# Patient Record
Sex: FEMALE | Race: WHITE | NOT HISPANIC OR LATINO | Employment: OTHER | URBAN - NONMETROPOLITAN AREA
[De-identification: names, ages, dates, MRNs, and addresses within clinical notes are randomized per-mention and may not be internally consistent; named-entity substitution may affect disease eponyms.]

---

## 2017-01-19 ENCOUNTER — OFFICE VISIT (OUTPATIENT)
Dept: GASTROENTEROLOGY | Facility: CLINIC | Age: 47
End: 2017-01-19

## 2017-01-19 VITALS
HEART RATE: 109 BPM | WEIGHT: 121.6 LBS | OXYGEN SATURATION: 98 % | HEIGHT: 67 IN | BODY MASS INDEX: 19.09 KG/M2 | DIASTOLIC BLOOD PRESSURE: 83 MMHG | SYSTOLIC BLOOD PRESSURE: 128 MMHG

## 2017-01-19 DIAGNOSIS — R10.32 LEFT LOWER QUADRANT PAIN: ICD-10-CM

## 2017-01-19 DIAGNOSIS — R11.2 NAUSEA AND VOMITING, INTRACTABILITY OF VOMITING NOT SPECIFIED, UNSPECIFIED VOMITING TYPE: ICD-10-CM

## 2017-01-19 DIAGNOSIS — K29.70 GASTRITIS WITHOUT BLEEDING, UNSPECIFIED CHRONICITY, UNSPECIFIED GASTRITIS TYPE: ICD-10-CM

## 2017-01-19 DIAGNOSIS — R63.4 WEIGHT LOSS: ICD-10-CM

## 2017-01-19 DIAGNOSIS — R10.12 LEFT UPPER QUADRANT PAIN: Primary | ICD-10-CM

## 2017-01-19 PROCEDURE — 99244 OFF/OP CNSLTJ NEW/EST MOD 40: CPT | Performed by: INTERNAL MEDICINE

## 2017-01-19 RX ORDER — ATORVASTATIN CALCIUM 20 MG/1
20 TABLET, FILM COATED ORAL DAILY
COMMUNITY
Start: 2017-01-05

## 2017-01-19 RX ORDER — VENLAFAXINE HYDROCHLORIDE 75 MG/1
75 CAPSULE, EXTENDED RELEASE ORAL DAILY
COMMUNITY
Start: 2016-12-26

## 2017-01-19 RX ORDER — GABAPENTIN 800 MG/1
800 TABLET ORAL 3 TIMES DAILY
COMMUNITY
Start: 2017-01-04

## 2017-01-19 RX ORDER — ERGOCALCIFEROL 1.25 MG/1
500000 CAPSULE ORAL WEEKLY
COMMUNITY
Start: 2016-12-26

## 2017-01-19 RX ORDER — OMEPRAZOLE 20 MG/1
20 CAPSULE, DELAYED RELEASE ORAL DAILY
COMMUNITY
Start: 2016-12-26

## 2017-01-19 RX ORDER — LORATADINE 10 MG/1
10 TABLET ORAL DAILY
COMMUNITY
Start: 2016-12-26

## 2017-01-19 RX ORDER — PROMETHAZINE HYDROCHLORIDE 25 MG/1
25 TABLET ORAL AS NEEDED
COMMUNITY
Start: 2016-12-07

## 2017-01-19 RX ORDER — CLONAZEPAM 0.5 MG/1
0.5 TABLET ORAL 3 TIMES DAILY
COMMUNITY
Start: 2016-12-26

## 2017-01-19 RX ORDER — LEVOTHYROXINE SODIUM 0.05 MG/1
50 TABLET ORAL DAILY
COMMUNITY
Start: 2017-01-13

## 2017-01-19 NOTE — MR AVS SNAPSHOT
Portia Dennis   1/19/2017 3:00 PM   Office Visit    Dept Phone:  [phone]   Encounter #:  [id number]    Provider:  Kei German III, MD   Department:  [facility]                Your Full Care Plan              Your Updated Medication List          This list is accurate as of: 1/19/17  3:28 PM.  Always use your most recent med list.                atorvastatin 20 MG tablet   Commonly known as:  LIPITOR       clonazePAM 0.5 MG tablet   Commonly known as:  KlonoPIN       gabapentin 800 MG tablet   Commonly known as:  NEURONTIN       levothyroxine 50 MCG tablet   Commonly known as:  SYNTHROID, LEVOTHROID       loratadine 10 MG tablet   Commonly known as:  CLARITIN       omeprazole 20 MG capsule   Commonly known as:  priLOSEC       promethazine 25 MG tablet   Commonly known as:  PHENERGAN       venlafaxine XR 75 MG 24 hr capsule   Commonly known as:  EFFEXOR-XR       vitamin D 08356 UNITS capsule capsule   Commonly known as:  ERGOCALCIFEROL               You Were Diagnosed With        Codes Comments    Left upper quadrant pain    -  Primary ICD-10-CM: R10.12  ICD-9-CM: 789.02     Left lower quadrant pain     ICD-10-CM: R10.32  ICD-9-CM: 789.04     Weight loss     ICD-10-CM: R63.4  ICD-9-CM: 783.21     Nausea and vomiting, intractability of vomiting not specified, unspecified vomiting type     ICD-10-CM: R11.2  ICD-9-CM: 787.01     Gastritis without bleeding, unspecified chronicity, unspecified gastritis type     ICD-10-CM: K29.70  ICD-9-CM: 535.50       Instructions     None    Patient Instructions History      Upcoming Appointments     Visit Type Date Time Department    NEW PATIENT 1/19/2017  3:00 PM [facility]      MyChart Signup     Our records indicate that you have declined [facility] MyChart signup. If you would like to sign up for [employer], please email [email or web address] or call [phone] to obtain an activation  "code.             Other Info from Your Visit           Allergies     Aspirin      Codeine      Ibuprofen      Naproxen      Tramadol        Reason for Visit     Abdominal Pain           Vital Signs     Blood Pressure Pulse Height Weight Oxygen Saturation Body Mass Index    128/83 109 67\" (170.2 cm) 121 lb 9.6 oz (55.2 kg) 98% 19.05 kg/m2    Smoking Status                   Current Every Day Smoker           Problems and Diagnoses Noted     Abdominal pain, left upper quadrant    -  Primary    Abdominal pain, left lower quadrant        Abnormal weight loss        Nausea and vomiting        Gastritis and gastroduodenitis            "

## 2017-01-19 NOTE — LETTER
January 19, 2017     Marbin Meza MD  [address] [place] [id number]    Patient: Portia Dennis   YOB: 1970   Date of Visit: 1/19/2017       Dear Dr. Derrick MD:    Thank you for referring Portia Dennis to me for evaluation. Below are the relevant portions of my assessment and plan of care.         Diagnosis Plan   1. Left upper quadrant pain  CT Abdomen Pelvis With Contrast   2. Left lower quadrant pain  CT Abdomen Pelvis With Contrast   3. Weight loss  CT Abdomen Pelvis With Contrast   4. Nausea and vomiting, intractability of vomiting not specified, unspecified vomiting type  CT Abdomen Pelvis With Contrast   5. Gastritis without bleeding, unspecified chronicity, unspecified gastritis type       REC  The cause for her chronic symptoms is not entirely clear.  Previous GI workup (2014) showed only gastritis.  I have requested CT abdomen/pelvis at this time for further evaluation.  She was instructed to continue her current medications without change.  I have asked her to see me for follow-up in about 4 weeks and she was instructed to call sooner if needed.    Return in about 4 weeks (around 2/16/2017).                  If you have questions, please do not hesitate to call me. I look forward to following Portia along with you.         Sincerely,        Kei German III, MD        CC: No Recipients

## 2017-01-19 NOTE — PROGRESS NOTES
Chief Complaint   Patient presents with   • Abdominal Pain     Portia Dennis is a 46 y.o. female who presents to the office today at the request of Marbin Meza MD for Abdominal Pain.    HPI  46-year-old white female presents with long (approximately 5 years) history of recurrent pain along the left side of her abdomen.  She cannot identify consistent precipitating or palliating factors.  She also reports lower back pain and left leg pain.  She reports intermittent nausea and vomiting.  Reports recent mild weight loss.  She reports occasional diarrhea without overt rectal bleeding.  She was previously evaluated by me with EGD and colonoscopy in 2014--gastritis was noted at that time.  More recently, she reports that EGD was performed by Dr. Daugherty in [place].  She has not had recent imaging studies performed.  Possibly pertinent to her present illness, she has known history of degenerative disc disease.  She told me that she previously was evaluated by Dr. Mckoy and she was told that she is not a surgical candidate.  Her current medications include Prilosec 20 mg daily and Phenergan as needed.      Review of Systems   Constitutional: Positive for chills and fatigue.   HENT: Negative.    Eyes: Negative.    Respiratory: Positive for cough, choking and wheezing.    Cardiovascular: Positive for chest pain.   Gastrointestinal: Positive for abdominal pain, diarrhea, nausea and vomiting.   Endocrine: Positive for cold intolerance.   Genitourinary: Positive for pelvic pain and urgency.   Musculoskeletal: Positive for back pain.   Skin: Negative.    Allergic/Immunologic: Negative.    Neurological: Positive for dizziness, light-headedness and numbness.   Hematological: Negative.    Psychiatric/Behavioral: Positive for sleep disturbance.       ACTIVE PROBLEMS:   Specialty Problems     None          PAST MEDICAL HISTORY:  Past Medical History   Diagnosis Date   • Hyperlipidemia        SURGICAL HISTORY:  Past  "Surgical History   Procedure Laterality Date   • Colonoscopy     • Upper gastrointestinal endoscopy         FAMILY HISTORY:  Family History   Problem Relation Age of Onset   • Diabetes Mother    • Heart disease Mother    • Diabetes Father    • Heart disease Father    • Cancer Father    • Diabetes Sister    • Diabetes Brother    • Colon cancer Brother        SOCIAL HISTORY:  Social History   Substance Use Topics   • Smoking status: Current Every Day Smoker   • Smokeless tobacco: Not on file   • Alcohol use No       CURRENT MEDICATION:    Current Outpatient Prescriptions:   •  atorvastatin (LIPITOR) 20 MG tablet, Take 20 mg by mouth Daily., Disp: , Rfl:   •  clonazePAM (KlonoPIN) 0.5 MG tablet, Take 0.5 mg by mouth 3 (Three) Times a Day., Disp: , Rfl:   •  gabapentin (NEURONTIN) 800 MG tablet, Take 800 mg by mouth 3 (Three) Times a Day., Disp: , Rfl:   •  levothyroxine (SYNTHROID, LEVOTHROID) 50 MCG tablet, Take 50 mcg by mouth Daily., Disp: , Rfl:   •  loratadine (CLARITIN) 10 MG tablet, Take 10 mg by mouth Daily., Disp: , Rfl:   •  omeprazole (priLOSEC) 20 MG capsule, Take 20 mg by mouth Daily., Disp: , Rfl:   •  promethazine (PHENERGAN) 25 MG tablet, Take 25 mg by mouth As Needed., Disp: , Rfl:   •  venlafaxine XR (EFFEXOR-XR) 75 MG 24 hr capsule, Take 75 mg by mouth Daily., Disp: , Rfl:   •  vitamin D (ERGOCALCIFEROL) 99470 UNITS capsule capsule, Take 500,000 Units by mouth 1 (One) Time Per Week., Disp: , Rfl:     ALLERGIES:  Aspirin; Codeine; Ibuprofen; Naproxen; and Tramadol    VISIT VITALS:  Visit Vitals   • /83   • Pulse 109   • Ht 67\" (170.2 cm)   • Wt 121 lb 9.6 oz (55.2 kg)   • SpO2 98%   • BMI 19.05 kg/m2       PHYSICAL EXAMINATION:  Physical Exam   Constitutional: She is oriented to person, place, and time. She appears well-developed and well-nourished.   HENT:   Head: Normocephalic and atraumatic.   Eyes: Conjunctivae and EOM are normal. Pupils are equal, round, and reactive to light.   Neck: " Normal range of motion. Neck supple.   Cardiovascular: Normal rate, regular rhythm and normal heart sounds.    Pulmonary/Chest: Effort normal and breath sounds normal.   Abdominal: Soft. Bowel sounds are normal.   Musculoskeletal: Normal range of motion.   Neurological: She is alert and oriented to person, place, and time. She has normal reflexes.   Skin: Skin is warm and dry.   Psychiatric: She has a normal mood and affect. Her behavior is normal.   Nursing note and vitals reviewed.      Assessment/Plan      Diagnosis Plan   1. Left upper quadrant pain  CT Abdomen Pelvis With Contrast   2. Left lower quadrant pain  CT Abdomen Pelvis With Contrast   3. Weight loss  CT Abdomen Pelvis With Contrast   4. Nausea and vomiting, intractability of vomiting not specified, unspecified vomiting type  CT Abdomen Pelvis With Contrast   5. Gastritis without bleeding, unspecified chronicity, unspecified gastritis type       REC  The cause for her chronic symptoms is not entirely clear.  Previous GI workup (2014) showed only gastritis.  I have requested CT abdomen/pelvis at this time for further evaluation.  She was instructed to continue her current medications without change.  I have asked her to see me for follow-up in about 4 weeks and she was instructed to call sooner if needed.    Return in about 4 weeks (around 2/16/2017).           Kei German III, MD

## 2017-01-26 ENCOUNTER — APPOINTMENT (OUTPATIENT)
Dept: CT IMAGING | Facility: HOSPITAL | Age: 47
End: 2017-01-26
Attending: INTERNAL MEDICINE

## 2017-01-26 ENCOUNTER — HOSPITAL ENCOUNTER (OUTPATIENT)
Dept: CT IMAGING | Facility: HOSPITAL | Age: 47
Discharge: HOME | End: 2017-01-26
Attending: INTERNAL MEDICINE | Admitting: INTERNAL MEDICINE

## 2017-01-26 DIAGNOSIS — R10.32 LEFT LOWER QUADRANT PAIN: ICD-10-CM

## 2017-01-26 DIAGNOSIS — R11.2 NAUSEA AND VOMITING, INTRACTABILITY OF VOMITING NOT SPECIFIED, UNSPECIFIED VOMITING TYPE: ICD-10-CM

## 2017-01-26 DIAGNOSIS — R63.4 WEIGHT LOSS: ICD-10-CM

## 2017-01-26 DIAGNOSIS — R10.12 LEFT UPPER QUADRANT PAIN: ICD-10-CM

## 2017-01-26 LAB
B-HCG UR QL: NEGATIVE
INTERNAL NEGATIVE CONTROL: NEGATIVE
INTERNAL POSITIVE CONTROL: POSITIVE
Lab: NORMAL

## 2017-01-26 PROCEDURE — 74177 CT ABD & PELVIS W/CONTRAST: CPT | Performed by: RADIOLOGY

## 2017-01-26 PROCEDURE — 0 IOPAMIDOL 61 % SOLUTION: Performed by: INTERNAL MEDICINE

## 2017-01-26 PROCEDURE — 74177 CT ABD & PELVIS W/CONTRAST: CPT

## 2017-01-26 RX ADMIN — IOPAMIDOL 80 ML: 612 INJECTION, SOLUTION INTRAVENOUS at 10:05

## 2020-08-27 ENCOUNTER — TRANSCRIBE ORDERS (OUTPATIENT)
Dept: ADMINISTRATIVE | Facility: HOSPITAL | Age: 50
End: 2020-08-27

## 2020-08-27 DIAGNOSIS — R10.9 ABDOMINAL PAIN, UNSPECIFIED ABDOMINAL LOCATION: Primary | ICD-10-CM

## 2020-09-03 ENCOUNTER — HOSPITAL ENCOUNTER (OUTPATIENT)
Dept: ULTRASOUND IMAGING | Facility: HOSPITAL | Age: 50
End: 2020-09-03

## 2020-09-16 ENCOUNTER — TRANSCRIBE ORDERS (OUTPATIENT)
Dept: ADMINISTRATIVE | Facility: HOSPITAL | Age: 50
End: 2020-09-16

## 2020-09-16 ENCOUNTER — HOSPITAL ENCOUNTER (OUTPATIENT)
Dept: ULTRASOUND IMAGING | Facility: HOSPITAL | Age: 50
Discharge: HOME | End: 2020-09-16

## 2020-09-16 ENCOUNTER — HOSPITAL ENCOUNTER (OUTPATIENT)
Dept: CT IMAGING | Facility: HOSPITAL | Age: 50
Discharge: HOME | End: 2020-09-16

## 2020-09-16 DIAGNOSIS — M47.814 THORACIC SPONDYLOSIS WITHOUT MYELOPATHY: ICD-10-CM

## 2020-09-16 DIAGNOSIS — M47.814 THORACIC SPONDYLOSIS WITHOUT MYELOPATHY: Primary | ICD-10-CM

## 2020-09-16 DIAGNOSIS — R10.9 ABDOMINAL PAIN, UNSPECIFIED ABDOMINAL LOCATION: ICD-10-CM

## 2020-09-16 PROCEDURE — 72128 CT CHEST SPINE W/O DYE: CPT | Performed by: RADIOLOGY

## 2020-09-16 PROCEDURE — 72128 CT CHEST SPINE W/O DYE: CPT

## 2020-09-21 ENCOUNTER — APPOINTMENT (OUTPATIENT)
Dept: ULTRASOUND IMAGING | Facility: HOSPITAL | Age: 50
End: 2020-09-21

## 2020-10-02 ENCOUNTER — HOSPITAL ENCOUNTER (OUTPATIENT)
Dept: ULTRASOUND IMAGING | Facility: HOSPITAL | Age: 50
Discharge: HOME | End: 2020-10-02
Admitting: FAMILY MEDICINE

## 2020-10-02 PROCEDURE — 76705 ECHO EXAM OF ABDOMEN: CPT | Performed by: RADIOLOGY

## 2020-10-02 PROCEDURE — 76705 ECHO EXAM OF ABDOMEN: CPT

## 2021-01-14 ENCOUNTER — TRANSCRIBE ORDERS (OUTPATIENT)
Dept: ADMINISTRATIVE | Facility: HOSPITAL | Age: 51
End: 2021-01-14

## 2021-01-14 DIAGNOSIS — E05.90 HYPERTHYROIDISM: Primary | ICD-10-CM

## 2022-07-18 ENCOUNTER — HOSPITAL ENCOUNTER (OUTPATIENT)
Dept: GENERAL RADIOLOGY | Facility: HOSPITAL | Age: 52
Discharge: HOME | End: 2022-07-18
Admitting: PAIN MEDICINE

## 2022-07-18 ENCOUNTER — TRANSCRIBE ORDERS (OUTPATIENT)
Dept: ADMINISTRATIVE | Facility: HOSPITAL | Age: 52
End: 2022-07-18

## 2022-07-18 DIAGNOSIS — M47.814 THORACIC SPONDYLOSIS WITHOUT MYELOPATHY: ICD-10-CM

## 2022-07-18 DIAGNOSIS — M47.814 THORACIC SPONDYLOSIS WITHOUT MYELOPATHY: Primary | ICD-10-CM

## 2022-07-18 PROCEDURE — 72072 X-RAY EXAM THORAC SPINE 3VWS: CPT | Performed by: RADIOLOGY

## 2022-07-18 PROCEDURE — 72072 X-RAY EXAM THORAC SPINE 3VWS: CPT

## 2022-11-06 ENCOUNTER — HOSPITAL ENCOUNTER (EMERGENCY)
Facility: HOSPITAL | Age: 52
Discharge: HOME | End: 2022-11-06
Attending: EMERGENCY MEDICINE | Admitting: EMERGENCY MEDICINE

## 2022-11-06 VITALS
BODY MASS INDEX: 18.8 KG/M2 | SYSTOLIC BLOOD PRESSURE: 127 MMHG | HEART RATE: 90 BPM | RESPIRATION RATE: 16 BRPM | HEIGHT: 66 IN | OXYGEN SATURATION: 96 % | DIASTOLIC BLOOD PRESSURE: 76 MMHG | TEMPERATURE: 97.7 F | WEIGHT: 117 LBS

## 2022-11-06 DIAGNOSIS — S40.862A TICK BITE OF LEFT UPPER ARM, INITIAL ENCOUNTER: Primary | ICD-10-CM
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
## 2022-11-06 PROCEDURE — 99282 EMERGENCY DEPT VISIT SF MDM: CPT

## 2022-11-06 RX ORDER — DOXYCYCLINE HYCLATE 100 MG/1
100 TABLET, DELAYED RELEASE ORAL 2 TIMES DAILY
Qty: 20 TABLET | Refills: 0 | Status: SHIPPED | OUTPATIENT
Start: 2022-11-06 | End: 2022-11-16

## 2022-11-06 NOTE — ED PROVIDER NOTES
Subjective   History of Present Illness  51 yo female patient presents to the ED with complaints of a tick bite on her left upper arm. PT states that she does not know if she got the head out of her arm. Patient states she used tweezers to remove the tick last night. Patient denies any fever or rash. Reports pain around the bite site. Patient states pain is exacerbated by palpation. Pt denies taking anything at home to alleviate pain.     History provided by:  Patient   used: No        Review of Systems   Constitutional: Negative.    HENT: Negative.    Eyes: Negative.    Respiratory: Negative.    Cardiovascular: Negative.    Gastrointestinal: Negative.    Endocrine: Negative.    Genitourinary: Negative.    Skin: Positive for wound.   Allergic/Immunologic: Negative.    Neurological: Negative.    Hematological: Negative.    Psychiatric/Behavioral: Negative.    All other systems reviewed and are negative.      Past Medical History:   Diagnosis Date   • Hyperlipidemia        Allergies   Allergen Reactions   • Aspirin    • Codeine    • Ibuprofen    • Naproxen    • Penicillins Other (See Comments)     Pt states it makes her feel weird   • Tramadol        Past Surgical History:   Procedure Laterality Date   • COLONOSCOPY     • UPPER GASTROINTESTINAL ENDOSCOPY         Family History   Problem Relation Age of Onset   • Diabetes Mother    • Heart disease Mother    • Diabetes Father    • Heart disease Father    • Cancer Father    • Diabetes Sister    • Diabetes Brother    • Colon cancer Brother        Social History     Socioeconomic History   • Marital status: Single   Tobacco Use   • Smoking status: Every Day   Substance and Sexual Activity   • Alcohol use: No           Objective   Physical Exam  Vitals and nursing note reviewed.   Constitutional:       Appearance: Normal appearance. She is normal weight.   HENT:      Head: Normocephalic and atraumatic.      Right Ear: External ear normal.      Left Ear:  External ear normal.      Nose: Nose normal.      Mouth/Throat:      Mouth: Mucous membranes are moist.      Pharynx: Oropharynx is clear.   Eyes:      Extraocular Movements: Extraocular movements intact.      Conjunctiva/sclera: Conjunctivae normal.      Pupils: Pupils are equal, round, and reactive to light.   Cardiovascular:      Rate and Rhythm: Normal rate and regular rhythm.      Pulses: Normal pulses.      Heart sounds: Normal heart sounds.   Pulmonary:      Effort: Pulmonary effort is normal.      Breath sounds: Normal breath sounds.   Abdominal:      General: Abdomen is flat. Bowel sounds are normal.      Palpations: Abdomen is soft.   Musculoskeletal:         General: Normal range of motion.      Cervical back: Normal range of motion and neck supple.   Skin:     General: Skin is warm and dry.      Capillary Refill: Capillary refill takes less than 2 seconds.          Neurological:      General: No focal deficit present.      Mental Status: She is alert and oriented to person, place, and time. Mental status is at baseline.   Psychiatric:         Mood and Affect: Mood normal.         Behavior: Behavior normal.         Thought Content: Thought content normal.         Judgment: Judgment normal.         Procedures           ED Course                                           MDM  Number of Diagnoses or Management Options  Risk of Complications, Morbidity, and/or Mortality  Presenting problems: minimal  Diagnostic procedures: minimal  Management options: minimal    Patient Progress  Patient progress: improved      Final diagnoses:   Tick bite of left upper arm, initial encounter       ED Disposition  ED Disposition     ED Disposition   Discharge    Condition   Stable    Comment   --             Tim Shrestha MD  [address]  [facility] [address]  [facility] [id number] [phone]    Schedule an appointment as soon as possible for a visit in 1 day           Medication List      New Prescriptions    doxycycline 100  MG enteric coated tablet  Commonly known as: DORYX  Take 1 tablet by mouth 2 (Two) Times a Day for 10 days.           Where to Get Your Medications      These medications were sent to [facility] [address] [place] [address]  [address] [address] - [phone]  - [phone]   [address] [address]    Phone: [phone]   · doxycycline 100 MG enteric coated tablet          Xiomy Mancilla PA  11/06/22 1302

## 2023-01-24 ENCOUNTER — HOSPITAL ENCOUNTER (OUTPATIENT)
Dept: GENERAL RADIOLOGY | Facility: HOSPITAL | Age: 53
Discharge: HOME | End: 2023-01-24
Admitting: PAIN MEDICINE
Payer: MEDICAID

## 2023-01-24 ENCOUNTER — TRANSCRIBE ORDERS (OUTPATIENT)
Dept: ADMINISTRATIVE | Facility: HOSPITAL | Age: 53
End: 2023-01-24
Payer: MEDICAID

## 2023-01-24 DIAGNOSIS — M25.511 RIGHT SHOULDER PAIN, UNSPECIFIED CHRONICITY: ICD-10-CM

## 2023-01-24 DIAGNOSIS — M25.511 RIGHT SHOULDER PAIN, UNSPECIFIED CHRONICITY: Primary | ICD-10-CM

## 2023-01-24 PROCEDURE — 73030 X-RAY EXAM OF SHOULDER: CPT

## 2023-01-24 PROCEDURE — 73030 X-RAY EXAM OF SHOULDER: CPT | Performed by: RADIOLOGY

## 2023-04-07 ENCOUNTER — HOSPITAL ENCOUNTER (OUTPATIENT)
Dept: GENERAL RADIOLOGY | Facility: HOSPITAL | Age: 53
Discharge: HOME | End: 2023-04-07
Payer: MEDICAID

## 2023-04-07 ENCOUNTER — TRANSCRIBE ORDERS (OUTPATIENT)
Dept: ADMINISTRATIVE | Facility: HOSPITAL | Age: 53
End: 2023-04-07
Payer: MEDICAID

## 2023-04-07 DIAGNOSIS — M54.16 RADICULOPATHY, LUMBAR REGION: Primary | ICD-10-CM

## 2023-04-07 DIAGNOSIS — M54.16 RADICULOPATHY, LUMBAR REGION: ICD-10-CM

## 2023-04-07 DIAGNOSIS — M25.552 LEFT HIP PAIN: ICD-10-CM

## 2023-04-07 PROCEDURE — 73502 X-RAY EXAM HIP UNI 2-3 VIEWS: CPT

## 2023-04-07 PROCEDURE — 73502 X-RAY EXAM HIP UNI 2-3 VIEWS: CPT | Performed by: RADIOLOGY

## 2023-04-07 PROCEDURE — 72100 X-RAY EXAM L-S SPINE 2/3 VWS: CPT

## 2023-04-07 PROCEDURE — 72100 X-RAY EXAM L-S SPINE 2/3 VWS: CPT | Performed by: RADIOLOGY
